# Patient Record
Sex: FEMALE | Race: WHITE
[De-identification: names, ages, dates, MRNs, and addresses within clinical notes are randomized per-mention and may not be internally consistent; named-entity substitution may affect disease eponyms.]

---

## 2018-10-09 ENCOUNTER — HOSPITAL ENCOUNTER (EMERGENCY)
Dept: HOSPITAL 4 - SED | Age: 58
Discharge: HOME | End: 2018-10-09
Payer: MEDICAID

## 2018-10-09 VITALS — SYSTOLIC BLOOD PRESSURE: 159 MMHG

## 2018-10-09 VITALS — WEIGHT: 125 LBS | BODY MASS INDEX: 21.34 KG/M2 | HEIGHT: 64 IN
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
## 2018-10-09 DIAGNOSIS — S60.452A: Primary | ICD-10-CM
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
## 2018-10-09 DIAGNOSIS — J45.909: ICD-10-CM

## 2018-10-09 DIAGNOSIS — R03.0: ICD-10-CM

## 2018-10-09 NOTE — NUR
Patient came in complaining of a fish hook which is stuck in her right 3rd 
digit. Patient says she was going through her back pack and there was a fish 
hook inside of it. Patient denies N/V/D and fever. No other 
complaints/injuries. Will cont. to monitor.

## 2018-10-09 NOTE — NUR
Patient given written and verbal discharge instructions and verbalizes 
understanding.  ER MD Dr. Carrero discussed with patient the results and treatment 
provided. Patient in stable condition. ID arm band removed. 

Rx of Motrin given. Patient educated on pain management and to follow up with 
PMD within the next week. Pain Scale 0/10.

Opportunity for questions provided and answered. Medication side effect fact 
sheet provided.

## 2018-10-09 NOTE — NUR
Patient reports last TDAP dose received was in 2002. Administered TDAP L arm 
IM. Patient tolerated well. Will cont. to monitor.

## 2018-12-01 ENCOUNTER — HOSPITAL ENCOUNTER (EMERGENCY)
Dept: HOSPITAL 4 - SED | Age: 58
Discharge: HOME | End: 2018-12-01
Payer: MEDICAID

## 2018-12-01 VITALS — SYSTOLIC BLOOD PRESSURE: 144 MMHG

## 2018-12-01 VITALS — SYSTOLIC BLOOD PRESSURE: 152 MMHG

## 2018-12-01 DIAGNOSIS — R03.0: ICD-10-CM
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
## 2018-12-01 DIAGNOSIS — S20.212A: Primary | ICD-10-CM

## 2018-12-01 DIAGNOSIS — J45.909: ICD-10-CM
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
## 2021-06-21 ENCOUNTER — HOSPITAL ENCOUNTER (EMERGENCY)
Dept: HOSPITAL 4 - SED | Age: 61
Discharge: HOME | End: 2021-06-21
Payer: MEDICAID

## 2021-06-21 VITALS — SYSTOLIC BLOOD PRESSURE: 147 MMHG

## 2021-06-21 VITALS — HEIGHT: 66 IN | BODY MASS INDEX: 24.11 KG/M2 | WEIGHT: 150 LBS

## 2021-06-21 VITALS — SYSTOLIC BLOOD PRESSURE: 159 MMHG
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
## 2021-06-21 DIAGNOSIS — S92.501A: Primary | ICD-10-CM
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
## 2021-06-21 NOTE — NUR
Patient given written and verbal discharge instructions and verbalizes 
understanding.  ER MD discussed with patient the results and treatment 
provided. Patient in stable condition. ID arm band removed. 

Rx of Acetaminophen with codeine given. Patient educated on pain management and 
to follow up with PMD. Pain Scale .

Opportunity for questions provided and answered. Medication side effect fact 
sheet provided.